# Patient Record
Sex: FEMALE | Race: WHITE | NOT HISPANIC OR LATINO | Employment: FULL TIME | URBAN - METROPOLITAN AREA
[De-identification: names, ages, dates, MRNs, and addresses within clinical notes are randomized per-mention and may not be internally consistent; named-entity substitution may affect disease eponyms.]

---

## 2021-04-14 LAB — GROUP B STREP PCR: NORMAL

## 2021-05-02 ENCOUNTER — HOSPITAL ENCOUNTER (INPATIENT)
Facility: CLINIC | Age: 31
LOS: 2 days | Discharge: HOME | End: 2021-05-04
Attending: OBSTETRICS & GYNECOLOGY | Admitting: OBSTETRICS & GYNECOLOGY
Payer: COMMERCIAL

## 2021-05-02 ENCOUNTER — ANESTHESIA (OUTPATIENT)
Dept: OBGYN | Facility: CLINIC | Age: 31
End: 2021-05-02
Payer: COMMERCIAL

## 2021-05-02 ENCOUNTER — ANESTHESIA EVENT (OUTPATIENT)
Dept: OBGYN | Facility: CLINIC | Age: 31
End: 2021-05-02
Payer: COMMERCIAL

## 2021-05-02 PROBLEM — Z37.9 NORMAL LABOR: Status: ACTIVE | Noted: 2021-05-02

## 2021-05-02 LAB
ABO + RH BLD: NORMAL
ABO + RH BLD: NORMAL
BASOPHILS # BLD AUTO: 0 10E9/L (ref 0–0.2)
BASOPHILS NFR BLD AUTO: 0.2 %
BLD GP AB SCN SERPL QL: NORMAL
BLOOD BANK CMNT PATIENT-IMP: NORMAL
DIFFERENTIAL METHOD BLD: ABNORMAL
EOSINOPHIL # BLD AUTO: 0 10E9/L (ref 0–0.7)
EOSINOPHIL NFR BLD AUTO: 0.1 %
ERYTHROCYTE [DISTWIDTH] IN BLOOD BY AUTOMATED COUNT: 13 % (ref 10–15)
HCT VFR BLD AUTO: 39.2 % (ref 35–47)
HGB BLD-MCNC: 13 G/DL (ref 11.7–15.7)
IMM GRANULOCYTES # BLD: 0.1 10E9/L (ref 0–0.4)
IMM GRANULOCYTES NFR BLD: 0.5 %
LABORATORY COMMENT REPORT: NORMAL
LYMPHOCYTES # BLD AUTO: 1.2 10E9/L (ref 0.8–5.3)
LYMPHOCYTES NFR BLD AUTO: 10.5 %
MCH RBC QN AUTO: 29.1 PG (ref 26.5–33)
MCHC RBC AUTO-ENTMCNC: 33.2 G/DL (ref 31.5–36.5)
MCV RBC AUTO: 88 FL (ref 78–100)
MONOCYTES # BLD AUTO: 0.8 10E9/L (ref 0–1.3)
MONOCYTES NFR BLD AUTO: 7.2 %
NEUTROPHILS # BLD AUTO: 9.4 10E9/L (ref 1.6–8.3)
NEUTROPHILS NFR BLD AUTO: 81.5 %
NRBC # BLD AUTO: 0 10*3/UL
NRBC BLD AUTO-RTO: 0 /100
PLATELET # BLD AUTO: 184 10E9/L (ref 150–450)
RBC # BLD AUTO: 4.46 10E12/L (ref 3.8–5.2)
SARS-COV-2 RNA RESP QL NAA+PROBE: NEGATIVE
SPECIMEN EXP DATE BLD: NORMAL
SPECIMEN SOURCE: NORMAL
WBC # BLD AUTO: 11.5 10E9/L (ref 4–11)

## 2021-05-02 PROCEDURE — 86900 BLOOD TYPING SEROLOGIC ABO: CPT | Performed by: OBSTETRICS & GYNECOLOGY

## 2021-05-02 PROCEDURE — 85025 COMPLETE CBC W/AUTO DIFF WBC: CPT | Performed by: OBSTETRICS & GYNECOLOGY

## 2021-05-02 PROCEDURE — 250N000011 HC RX IP 250 OP 636: Performed by: ANESTHESIOLOGY

## 2021-05-02 PROCEDURE — 10907ZC DRAINAGE OF AMNIOTIC FLUID, THERAPEUTIC FROM PRODUCTS OF CONCEPTION, VIA NATURAL OR ARTIFICIAL OPENING: ICD-10-PCS | Performed by: OBSTETRICS & GYNECOLOGY

## 2021-05-02 PROCEDURE — 36415 COLL VENOUS BLD VENIPUNCTURE: CPT | Performed by: OBSTETRICS & GYNECOLOGY

## 2021-05-02 PROCEDURE — 86850 RBC ANTIBODY SCREEN: CPT | Performed by: OBSTETRICS & GYNECOLOGY

## 2021-05-02 PROCEDURE — 3E0R3BZ INTRODUCTION OF ANESTHETIC AGENT INTO SPINAL CANAL, PERCUTANEOUS APPROACH: ICD-10-PCS | Performed by: ANESTHESIOLOGY

## 2021-05-02 PROCEDURE — 120N000001 HC R&B MED SURG/OB

## 2021-05-02 PROCEDURE — 250N000009 HC RX 250: Performed by: ANESTHESIOLOGY

## 2021-05-02 PROCEDURE — 86780 TREPONEMA PALLIDUM: CPT | Performed by: OBSTETRICS & GYNECOLOGY

## 2021-05-02 PROCEDURE — 258N000003 HC RX IP 258 OP 636: Performed by: OBSTETRICS & GYNECOLOGY

## 2021-05-02 PROCEDURE — G0463 HOSPITAL OUTPT CLINIC VISIT: HCPCS | Mod: 25

## 2021-05-02 PROCEDURE — 00HU33Z INSERTION OF INFUSION DEVICE INTO SPINAL CANAL, PERCUTANEOUS APPROACH: ICD-10-PCS | Performed by: ANESTHESIOLOGY

## 2021-05-02 PROCEDURE — 59025 FETAL NON-STRESS TEST: CPT

## 2021-05-02 PROCEDURE — 370N000003 HC ANESTHESIA WARD SERVICE

## 2021-05-02 PROCEDURE — 86901 BLOOD TYPING SEROLOGIC RH(D): CPT | Performed by: OBSTETRICS & GYNECOLOGY

## 2021-05-02 PROCEDURE — 87635 SARS-COV-2 COVID-19 AMP PRB: CPT | Performed by: OBSTETRICS & GYNECOLOGY

## 2021-05-02 RX ORDER — OXYCODONE AND ACETAMINOPHEN 5; 325 MG/1; MG/1
1 TABLET ORAL
Status: DISCONTINUED | OUTPATIENT
Start: 2021-05-02 | End: 2021-05-03

## 2021-05-02 RX ORDER — SODIUM CHLORIDE, SODIUM LACTATE, POTASSIUM CHLORIDE, CALCIUM CHLORIDE 600; 310; 30; 20 MG/100ML; MG/100ML; MG/100ML; MG/100ML
INJECTION, SOLUTION INTRAVENOUS CONTINUOUS
Status: DISCONTINUED | OUTPATIENT
Start: 2021-05-02 | End: 2021-05-03

## 2021-05-02 RX ORDER — NALOXONE HYDROCHLORIDE 0.4 MG/ML
0.2 INJECTION, SOLUTION INTRAMUSCULAR; INTRAVENOUS; SUBCUTANEOUS
Status: DISCONTINUED | OUTPATIENT
Start: 2021-05-02 | End: 2021-05-03

## 2021-05-02 RX ORDER — ONDANSETRON 2 MG/ML
4 INJECTION INTRAMUSCULAR; INTRAVENOUS EVERY 6 HOURS PRN
Status: DISCONTINUED | OUTPATIENT
Start: 2021-05-02 | End: 2021-05-04 | Stop reason: HOSPADM

## 2021-05-02 RX ORDER — OXYTOCIN 10 [USP'U]/ML
10 INJECTION, SOLUTION INTRAMUSCULAR; INTRAVENOUS
Status: DISCONTINUED | OUTPATIENT
Start: 2021-05-02 | End: 2021-05-03

## 2021-05-02 RX ORDER — OXYTOCIN/0.9 % SODIUM CHLORIDE 30/500 ML
100-340 PLASTIC BAG, INJECTION (ML) INTRAVENOUS CONTINUOUS PRN
Status: DISCONTINUED | OUTPATIENT
Start: 2021-05-02 | End: 2021-05-04 | Stop reason: HOSPADM

## 2021-05-02 RX ORDER — IBUPROFEN 400 MG/1
800 TABLET, FILM COATED ORAL
Status: DISCONTINUED | OUTPATIENT
Start: 2021-05-02 | End: 2021-05-04 | Stop reason: HOSPADM

## 2021-05-02 RX ORDER — NALBUPHINE HYDROCHLORIDE 10 MG/ML
2.5-5 INJECTION, SOLUTION INTRAMUSCULAR; INTRAVENOUS; SUBCUTANEOUS EVERY 6 HOURS PRN
Status: DISCONTINUED | OUTPATIENT
Start: 2021-05-02 | End: 2021-05-04 | Stop reason: HOSPADM

## 2021-05-02 RX ORDER — ONDANSETRON 2 MG/ML
4 INJECTION INTRAMUSCULAR; INTRAVENOUS EVERY 6 HOURS PRN
Status: DISCONTINUED | OUTPATIENT
Start: 2021-05-02 | End: 2021-05-03

## 2021-05-02 RX ORDER — ACETAMINOPHEN 325 MG/1
650 TABLET ORAL EVERY 4 HOURS PRN
Status: DISCONTINUED | OUTPATIENT
Start: 2021-05-02 | End: 2021-05-03

## 2021-05-02 RX ORDER — NALOXONE HYDROCHLORIDE 0.4 MG/ML
0.4 INJECTION, SOLUTION INTRAMUSCULAR; INTRAVENOUS; SUBCUTANEOUS
Status: DISCONTINUED | OUTPATIENT
Start: 2021-05-02 | End: 2021-05-03

## 2021-05-02 RX ORDER — TRANEXAMIC ACID 10 MG/ML
1 INJECTION, SOLUTION INTRAVENOUS EVERY 30 MIN PRN
Status: DISCONTINUED | OUTPATIENT
Start: 2021-05-02 | End: 2021-05-04 | Stop reason: HOSPADM

## 2021-05-02 RX ORDER — ROPIVACAINE HYDROCHLORIDE 2 MG/ML
INJECTION, SOLUTION EPIDURAL; INFILTRATION; PERINEURAL PRN
Status: DISCONTINUED | OUTPATIENT
Start: 2021-05-02 | End: 2021-05-13

## 2021-05-02 RX ORDER — CARBOPROST TROMETHAMINE 250 UG/ML
250 INJECTION, SOLUTION INTRAMUSCULAR
Status: DISCONTINUED | OUTPATIENT
Start: 2021-05-02 | End: 2021-05-03

## 2021-05-02 RX ORDER — FENTANYL CITRATE 50 UG/ML
100 INJECTION, SOLUTION INTRAMUSCULAR; INTRAVENOUS ONCE
Status: DISCONTINUED | OUTPATIENT
Start: 2021-05-02 | End: 2021-05-04 | Stop reason: HOSPADM

## 2021-05-02 RX ORDER — METHYLERGONOVINE MALEATE 0.2 MG/ML
200 INJECTION INTRAVENOUS
Status: DISCONTINUED | OUTPATIENT
Start: 2021-05-02 | End: 2021-05-03

## 2021-05-02 RX ORDER — LIDOCAINE HYDROCHLORIDE AND EPINEPHRINE 15; 5 MG/ML; UG/ML
INJECTION, SOLUTION EPIDURAL PRN
Status: DISCONTINUED | OUTPATIENT
Start: 2021-05-02 | End: 2021-05-13

## 2021-05-02 RX ORDER — ONDANSETRON 4 MG/1
4 TABLET, ORALLY DISINTEGRATING ORAL EVERY 6 HOURS PRN
Status: DISCONTINUED | OUTPATIENT
Start: 2021-05-02 | End: 2021-05-04 | Stop reason: HOSPADM

## 2021-05-02 RX ORDER — EPHEDRINE SULFATE 50 MG/ML
5 INJECTION, SOLUTION INTRAMUSCULAR; INTRAVENOUS; SUBCUTANEOUS
Status: DISCONTINUED | OUTPATIENT
Start: 2021-05-02 | End: 2021-05-04 | Stop reason: HOSPADM

## 2021-05-02 RX ORDER — PRENATAL VIT/IRON FUM/FOLIC AC 27MG-0.8MG
1 TABLET ORAL DAILY
COMMUNITY

## 2021-05-02 RX ORDER — FENTANYL CITRATE 50 UG/ML
INJECTION, SOLUTION INTRAMUSCULAR; INTRAVENOUS PRN
Status: DISCONTINUED | OUTPATIENT
Start: 2021-05-02 | End: 2021-05-13

## 2021-05-02 RX ORDER — ROPIVACAINE HYDROCHLORIDE 2 MG/ML
10 INJECTION, SOLUTION EPIDURAL; INFILTRATION; PERINEURAL ONCE
Status: DISCONTINUED | OUTPATIENT
Start: 2021-05-02 | End: 2021-05-04 | Stop reason: HOSPADM

## 2021-05-02 RX ADMIN — ROPIVACAINE HYDROCHLORIDE 5 ML: 2 INJECTION, SOLUTION EPIDURAL; INFILTRATION at 20:18

## 2021-05-02 RX ADMIN — SODIUM CHLORIDE, POTASSIUM CHLORIDE, SODIUM LACTATE AND CALCIUM CHLORIDE 1000 ML: 600; 310; 30; 20 INJECTION, SOLUTION INTRAVENOUS at 19:40

## 2021-05-02 RX ADMIN — SODIUM CHLORIDE, POTASSIUM CHLORIDE, SODIUM LACTATE AND CALCIUM CHLORIDE: 600; 310; 30; 20 INJECTION, SOLUTION INTRAVENOUS at 20:15

## 2021-05-02 RX ADMIN — LIDOCAINE HYDROCHLORIDE AND EPINEPHRINE 3 ML: 15; 5 INJECTION, SOLUTION EPIDURAL at 20:16

## 2021-05-02 RX ADMIN — Medication 12 ML/HR: at 20:15

## 2021-05-02 RX ADMIN — ROPIVACAINE HYDROCHLORIDE 5 ML: 2 INJECTION, SOLUTION EPIDURAL; INFILTRATION at 20:20

## 2021-05-02 RX ADMIN — FENTANYL CITRATE 100 MCG: 50 INJECTION, SOLUTION INTRAMUSCULAR; INTRAVENOUS at 20:18

## 2021-05-02 ASSESSMENT — ACTIVITIES OF DAILY LIVING (ADL)
DRESSING/BATHING_DIFFICULTY: NO
DIFFICULTY_COMMUNICATING: NO
HEARING_DIFFICULTY_OR_DEAF: NO
DIFFICULTY_EATING/SWALLOWING: NO
PATIENT_/_FAMILY_COMMUNICATION_STYLE: SPOKEN LANGUAGE (ENGLISH OR BILINGUAL)
TOILETING_ISSUES: NO
FALL_HISTORY_WITHIN_LAST_SIX_MONTHS: NO
WALKING_OR_CLIMBING_STAIRS_DIFFICULTY: NO
CONCENTRATING,_REMEMBERING_OR_MAKING_DECISIONS_DIFFICULTY: NO
DOING_ERRANDS_INDEPENDENTLY_DIFFICULTY: NO
FALL_HISTORY_WITHIN_LAST_SIX_MONTHS: NO
WEAR_GLASSES_OR_BLIND: YES

## 2021-05-02 ASSESSMENT — MIFFLIN-ST. JEOR: SCORE: 1503.79

## 2021-05-03 LAB — T PALLIDUM AB SER QL: NONREACTIVE

## 2021-05-03 PROCEDURE — 0KQM0ZZ REPAIR PERINEUM MUSCLE, OPEN APPROACH: ICD-10-PCS | Performed by: OBSTETRICS & GYNECOLOGY

## 2021-05-03 PROCEDURE — 250N000013 HC RX MED GY IP 250 OP 250 PS 637: Performed by: OBSTETRICS & GYNECOLOGY

## 2021-05-03 PROCEDURE — 120N000012 HC R&B POSTPARTUM

## 2021-05-03 PROCEDURE — 722N000001 HC LABOR CARE VAGINAL DELIVERY SINGLE

## 2021-05-03 RX ORDER — MODIFIED LANOLIN
OINTMENT (GRAM) TOPICAL
Status: DISCONTINUED | OUTPATIENT
Start: 2021-05-03 | End: 2021-05-04 | Stop reason: HOSPADM

## 2021-05-03 RX ORDER — OXYTOCIN 10 [USP'U]/ML
10 INJECTION, SOLUTION INTRAMUSCULAR; INTRAVENOUS
Status: DISCONTINUED | OUTPATIENT
Start: 2021-05-03 | End: 2021-05-04 | Stop reason: HOSPADM

## 2021-05-03 RX ORDER — ACETAMINOPHEN 325 MG/1
650 TABLET ORAL EVERY 4 HOURS PRN
Status: DISCONTINUED | OUTPATIENT
Start: 2021-05-03 | End: 2021-05-04 | Stop reason: HOSPADM

## 2021-05-03 RX ORDER — OXYTOCIN/0.9 % SODIUM CHLORIDE 30/500 ML
340 PLASTIC BAG, INJECTION (ML) INTRAVENOUS CONTINUOUS PRN
Status: DISCONTINUED | OUTPATIENT
Start: 2021-05-03 | End: 2021-05-04 | Stop reason: HOSPADM

## 2021-05-03 RX ORDER — AMOXICILLIN 250 MG
2 CAPSULE ORAL 2 TIMES DAILY
Status: DISCONTINUED | OUTPATIENT
Start: 2021-05-03 | End: 2021-05-04 | Stop reason: HOSPADM

## 2021-05-03 RX ORDER — BISACODYL 10 MG
10 SUPPOSITORY, RECTAL RECTAL DAILY PRN
Status: DISCONTINUED | OUTPATIENT
Start: 2021-05-05 | End: 2021-05-04 | Stop reason: HOSPADM

## 2021-05-03 RX ORDER — IBUPROFEN 800 MG/1
800 TABLET, FILM COATED ORAL EVERY 6 HOURS PRN
Start: 2021-05-03

## 2021-05-03 RX ORDER — TRANEXAMIC ACID 10 MG/ML
1 INJECTION, SOLUTION INTRAVENOUS EVERY 30 MIN PRN
Status: DISCONTINUED | OUTPATIENT
Start: 2021-05-03 | End: 2021-05-04 | Stop reason: HOSPADM

## 2021-05-03 RX ORDER — IBUPROFEN 400 MG/1
800 TABLET, FILM COATED ORAL EVERY 6 HOURS PRN
Status: DISCONTINUED | OUTPATIENT
Start: 2021-05-03 | End: 2021-05-04 | Stop reason: HOSPADM

## 2021-05-03 RX ORDER — AMOXICILLIN 250 MG
1 CAPSULE ORAL 2 TIMES DAILY
Status: DISCONTINUED | OUTPATIENT
Start: 2021-05-03 | End: 2021-05-04 | Stop reason: HOSPADM

## 2021-05-03 RX ORDER — AMOXICILLIN 250 MG
2 CAPSULE ORAL DAILY PRN
Start: 2021-05-03

## 2021-05-03 RX ORDER — HYDROCORTISONE 2.5 %
CREAM (GRAM) TOPICAL 3 TIMES DAILY PRN
Status: DISCONTINUED | OUTPATIENT
Start: 2021-05-03 | End: 2021-05-04 | Stop reason: HOSPADM

## 2021-05-03 RX ORDER — ACETAMINOPHEN 325 MG/1
650 TABLET ORAL EVERY 4 HOURS PRN
Start: 2021-05-03

## 2021-05-03 RX ORDER — OXYTOCIN/0.9 % SODIUM CHLORIDE 30/500 ML
100 PLASTIC BAG, INJECTION (ML) INTRAVENOUS CONTINUOUS
Status: DISCONTINUED | OUTPATIENT
Start: 2021-05-03 | End: 2021-05-04 | Stop reason: HOSPADM

## 2021-05-03 RX ADMIN — ACETAMINOPHEN 650 MG: 325 TABLET, FILM COATED ORAL at 16:44

## 2021-05-03 RX ADMIN — IBUPROFEN 800 MG: 400 TABLET ORAL at 07:57

## 2021-05-03 RX ADMIN — ACETAMINOPHEN 650 MG: 325 TABLET, FILM COATED ORAL at 01:39

## 2021-05-03 RX ADMIN — IBUPROFEN 800 MG: 400 TABLET ORAL at 01:39

## 2021-05-03 RX ADMIN — ACETAMINOPHEN 650 MG: 325 TABLET, FILM COATED ORAL at 23:05

## 2021-05-03 RX ADMIN — IBUPROFEN 800 MG: 400 TABLET ORAL at 23:05

## 2021-05-03 RX ADMIN — IBUPROFEN 800 MG: 400 TABLET ORAL at 16:44

## 2021-05-03 RX ADMIN — ACETAMINOPHEN 650 MG: 325 TABLET, FILM COATED ORAL at 07:57

## 2021-05-03 RX ADMIN — SENNOSIDES AND DOCUSATE SODIUM 1 TABLET: 8.6; 5 TABLET ORAL at 19:55

## 2021-05-03 RX ADMIN — SENNOSIDES AND DOCUSATE SODIUM 1 TABLET: 8.6; 5 TABLET ORAL at 07:57

## 2021-05-03 NOTE — ANESTHESIA PROCEDURE NOTES
Epidural catheter Procedure Note  Pre-Procedure   Staff -        Anesthesiologist:  Kamla Junior MD       Performed By: anesthesiologist       Location: OB       Pre-Anesthestic Checklist: patient identified, IV checked, site marked, risks and benefits discussed, informed consent, monitors and equipment checked, pre-op evaluation and at physician/surgeon's request  Timeout:       Correct Patient: Yes        Correct Procedure: Yes        Correct Site: Yes        Correct Position: Yes   Procedure Documentation  Procedure: epidural catheter       Patient Position: sitting       Skin prep: Betadine       Local skin infiltrated with 1 mL of 1% lidocaine.        Insertion Site: L2-3. (midline approach).       Technique: LORT saline and LORT air        Needle Type: Touhy needle       Needle Gauge: 17.        Needle Length (Inches): 3.5        Catheter: 19 G.         Catheter threaded easily.        # of attempts: 1 and  # of redirects:     Assessment/Narrative         Paresthesias: No.      Test dose of 3 mL lidocaine 1.5% w/ 1:200,000 epinephrine at.         Test dose negative, 3 minutes after injection, for signs of intravascular, subdural, or intrathecal injection.       Insertion/Infusion Method: LORT saline and LORT air       Aspiration negative for Heme or CSF via Epidural Catheter.    Comments:  Pre-procedure time out completed. Patient in sitting position, the lumbar spine was prepped and draped in sterile fashion. The L2/L3 interspace was identified and local anesthetic was injected for local skin infiltration. A 17 G touhy needle was advanced to the epidural space which was confirmed with the loss of resistance technique at 4.5 cm. A catheter was then advanced easily into the epidural space. The catheter was left at 8.5 cm at the skin. Negative aspiration of blood and CSF was confirmed. A test dose of 1.5% lidocaine with 1:200,000 epinephrine was injected through the catheter and was negative for  intravascular injection. The site was covered with sterile tegaderm and the catheter was secured with tape.

## 2021-05-03 NOTE — PLAN OF CARE
Pt admitted to [facility] [place], ambulatory per services of Dr Leigh for evaluation of labor eval.  Pt states that she started sera at 0430 and this afternoon they began stronger this afternoon. Pt appears uncomfortable amd is breathing through contractions.  Discussed plan of care including EFM with NST, routine VS, and SVE.  Pt agreeable.  EFM applied and admission assessment completed.  SVE 6-7/80/-1. Sera q 2-3. FHT's category 1. Dr Leigh updated at 1910. Received orders to admit to labor, labs and covid swab. Pt and  updated with plan. Report given to Binh Corley RN.

## 2021-05-03 NOTE — PROGRESS NOTES
"Linda Montalvo  May 3, 2021   PPD 0 s/p     S: 31 year old  delivered at 39w2d   Doing well without complaints.  Sore but medication helping.   Lochia moderate.   Ambulating.  Urinating without issues.  Breastfeeding.    O: /76   Pulse 62   Temp 98.7  F (37.1  C) (Temporal)   Resp 16   Ht 1.727 m (5' 8\")   Wt 73 kg (161 lb)   SpO2 97%   Breastfeeding Unknown   BMI 24.48 kg/m    Gen: NAD  Abd: soft, NT, ND, FF 2 below U  Ext: NT, nonedematous    A/P:  31 year old  PPD0 s/p   - ibuprofen and acetaminophen PRN pain  - support breastfeeding  - monitor lochia  - encourage ambulation  - regular diet  - routine PP care  - anticipated discharge to home tomorrow    Racquel Braxton MD  Text Page (8am - 5pm)    "

## 2021-05-03 NOTE — PROGRESS NOTES
"May 2, 2021      Asked by RN on behalf of Dr. Leigh to perform amniotomy.        /88   Pulse 85   Temp 99.4  F (37.4  C) (Temporal)   Resp 16   Ht 1.727 m (5' 8\")   Wt 73 kg (161 lb)   BMI 24.48 kg/m    Gen: NAD, A&O x 3, comfortable with epidural  Abd: gravid, NT  SVE: 8/90/-1, vertex, hand palpated on R, however was able to get baby move out of way with palpation> amniotomy performed, clear fluid noted  FHT: cat 1  TOCO: Q3-4 min        BEN PHILIPPE MD        "

## 2021-05-03 NOTE — PLAN OF CARE
Data: Patient admitted to room 232 at 1928. Patient is a . Prenatal record reviewed.   OB History    Para Term  AB Living   1 0 0 0 0 0   SAB TAB Ectopic Multiple Live Births   0 0 0 0 0      # Outcome Date GA Lbr Trip/2nd Weight Sex Delivery Anes PTL Lv   1 Current            .  Medical History: History reviewed. No pertinent past medical history..  Gestational age 39w1d. Vital signs per doc flowsheet. Fetal movement present. Patient reports Rule Out Labor   as reason for admission. Support persons Luke present.  Action: Report from HANNY Brothers obtained at 1920. Care of patient assumed at 1920. Verbal consent for EFM, external fetal monitors applied. Admission assessment completed. Patient and support persons educated on labor process. Patient instructed to report change in fetal movement, contractions, vaginal leaking of fluid or bleeding, abdominal pain, or any concerns related to the pregnancy to her nurse/physician. Patient oriented to room, call light in reach.   Response: Dr. Leigh informed of admission. Plan per provider is ok to get epidural when pt requests,  Cont labor process. Patient verbalized understanding of education and verbalized agreement with plan. Patient coping with labor and requesting epidural.       21   Provider Notification   Provider Name/Title [facility]   Method of Notification At Bedside   Request Evaluate in Person   Notification Reason Pain  (epidural placement )        21   Vaginal Exam   Method sterile exam per RN   Cervical Dilation (cm) 7   Cervical Effacement 80-90%   Fetal Station -1        21   Provider Notification   Provider Name/Title Dr Leigh    Method of Notification Phone   Request Evaluate - Remote   Notification Reason Status Update  (pt MD susanne requesting Dr Young to arom )        21   Provider Notification   Provider Name/Title Dr Yelitza Belle    Method of Notification At Bedside   Request Evaluate in Person    Notification Reason SVE;Membrane Status  (AROM )      05/02/21 2110   Vaginal Exam   Method sterile exam per physician   Cervical Consistency soft   Cervical Dilation (cm) 8   Cervical Effacement 90%   Fetal Station -1        05/02/21 2122   Provider Notification   Provider Name/Title Dr Leigh    Method of Notification Electronic Page   Request Evaluate - Remote   Notification Reason SVE;Membrane Status  (Dr Hector MUIR'd pt at 2110, 8/90/-1, clear fluid, baby wnl)        05/02/21 2302   Vaginal Exam   Method sterile exam per RN   Cervical Dilation (cm) 10   Cervical Effacement 100%   Fetal Station 1      05/02/21 2306   Provider Notification   Provider Name/Title Dr Leigh    Method of Notification Electronic Page   Request Evaluate - Remote   Notification Reason SVE;Status Update  (complete)      05/02/21 2309   Provider Notification   Provider Name/Title Dr Leigh    Method of Notification Phone   Request Evaluate - Remote   Notification Reason Status Update  (MD coming into hosp, start pushing )

## 2021-05-03 NOTE — PLAN OF CARE
Pt, support person, and infant transferred to unit at 0311 accompanied by labor RN.  ID bands double verified.  Pt oriented to room and call light placed within reach.  Safety protocols including band checks, safe sleep practices, and bulb syringe use reviewed.  Pt verbally acknowledged understanding of teaching.  Encouraged to call with questions or concerns.  VSS on RA.  Fundus firm, midline, U/ U .  Scant lochia rubra.  Pt is able to empty bladder independently.  Voiding adequately.  Up independently.  Pain managed w/Tylenol and Ibuprofen.  Pt breastfeeding with assistance.  Bonding well w/infant.  Independent w/infant cares.  Spouse supportive at bedside.  Nursing to continue to monitor.

## 2021-05-03 NOTE — L&D DELIVERY NOTE
"VAGINAL DELIVERY NOTE    Date of Delivery:  5/3/2021    Linda Montalvo is a 31 year old  who was admitted at 39w2d due to active labor at 39 weeks.   Her prenatal course was significant for normal care.   Her GBS was negative.  After admission to Labor and Delivery, the patient was 6-7cm and intact. Patient had regular contractions.   She received epidural for pain.  She has AROM with clear fluid at 8 cm dilated.   During the first stage the FHRT was category I.     The patient progressed to complete dilation and started pushing at 2330. The FHT's were category I and were monitored with external monitors.  She pushed with good effort for less than 1 hour and baby remained category I.     The fetal vertex delivered over an intact perineum in an JAROCHO position. A nuchal cord was not present. The remainder of the baby was delivered easily- a viable 8lb 9oz male infant named \"Garth Hernandez\"with Apgars of 8/9. There was no shoulder dystocia present. The baby was placed on the maternal abdomen and delayed cord clamping was done. The patient received IV pitocin immediately after delivery. Cord blood was obtained.  The placenta delivered spontaneously intact and was not sent to pathology.     There was a left labial 2nd degree laceration and a right introital laceration extending out 2 cm from introitus repaired with 3-0 Vicryl in the standard fashion with good hemostasis. After the repair a small superficial hematoma was seen inside of right side of the vagina and it was watched for several minutes and was not expanding.  The vagina and perineum were inspected and no additional tears noted. Count were correct and the fundus was massaged with the RN and it was firm with no active clots or bleeding.   EBL=100 ml.    Mother and infant were doing well.      No gross fetal defects were observed.  The baby was stable in Mom's arms.  The mother was stable in the labor bed.  Sponge and sharp count is correct. There were no " complications.    Sadia Leigh MD

## 2021-05-03 NOTE — LACTATION NOTE
Initial Lactation visit with Linda, significant other Berlin & baby Jack. Linda reports baby has been too sleepy to feed well so far. He's been spitty. LC reviewed typical  feeding patterns. Discussed cluster feeding, what it is and when to expect it, The Second Night, satiety cues, feeding cues, and reviewed Feeding Log for home use.     LC assisted to wake infant and try a feeding. At time of visit, baby showing some feeding cues, but pursed lips and disinterested in opening mouth to suck. Brought to breast, he attempted latching several times, but became gaggy as nipple entered mouth. Discussed general positioning recommendations, how to check for a good vs shallow latch, and encouraged calling for latch checks when he becomes interested in feeding. Encouraged pumping after this feeding attempt, and anytime moving forward that baby has a breastfeeding attempt, for stimulation. LC will bring pump to room and review initiate program, pump settings and cleaning.    Feeding plan; Recommend unlimited, frequent breast feedings: At least 8 - 12 times every 24 hours. Pump going forward anytime baby is too sleepy to feed. If baby becomes interested in latching, no need to pump with good feedings.    Recommended rooming in. Instructed in hand expression. Avoid pacifiers and supplementation with formula unless medically indicated. Explained benefits of holding baby skin on skin to help promote better breastfeeding outcomes. Linda has a pump for home use. Linda & Berlin very appreciative of Lactation support. Will revisit as needed.    Natasha Tobar, RN-C, IBCLC, MNN, PHN, BSN

## 2021-05-03 NOTE — H&P
[facility] Labor and Delivery History and Physical    Linda Montalvo MRN# [id number]   Age: 31 year old YOB: 1990     Date of Admission:  2021    Primary care provider: Ye Ribera           Chief Complaint:   Linda Montalvo is a 31 year old female  who is 39w1d pregnant and being admitted for active labor management.  Pt transferred to our group at 21 weeks; h/o LEEP at cervical length at 16 weeks normal.  NIPT normal- surprise gender; AFP normal. Pt had COVID #1 on 21 and was scheduled for #2 on 5/3/21.  Pt presented in labor at 6-7cm earlier tonight and received epidural; GBS negative; now complete; category I tracing so far.          Pregnancy history:     OBSTETRIC HISTORY:    OB History    Para Term  AB Living   1 0 0 0 0 0   SAB TAB Ectopic Multiple Live Births   0 0 0 0 0      # Outcome Date GA Lbr Trip/2nd Weight Sex Delivery Anes PTL Lv   1 Current                EDC: Estimated Date of Delivery: 21    Prenatal Labs:   Lab Results   Component Value Date    ABO A 2021    RH Pos 2021    AS Neg 2021    HGB 13.0 2021       GBS Status:   No results found for: GBS    Active Problem List  Patient Active Problem List   Diagnosis     Indication for care in labor or delivery       Medication Prior to Admission  Medications Prior to Admission   Medication Sig Dispense Refill Last Dose     Prenatal Vit-Fe Fumarate-FA (PRENATAL MULTIVITAMIN W/IRON) 27-0.8 MG tablet Take 1 tablet by mouth daily   2021 at Unknown time   .        Maternal Past Medical History:   History reviewed. No pertinent past medical history.                    Family History:   This patient has no significant family history            Social History:   This patient has no significant social history         Review of Systems:   The Review of Systems is negative other than noted in the HPI          Physical Exam:     Vitals were reviewed  Patient Vitals  for the past 8 hrs:   BP Temp Temp src Pulse Resp SpO2 Height Weight   05/02/21 2205 106/68 99.3  F (37.4  C) Temporal -- -- -- -- --   05/02/21 2148 106/65 -- -- -- -- -- -- --   05/02/21 2146 107/62 -- -- -- -- -- -- --   05/02/21 2145 -- -- -- -- -- 95 % -- --   05/02/21 2144 107/66 -- -- -- -- -- -- --   05/02/21 2142 112/67 -- -- -- -- -- -- --   05/02/21 2140 108/67 -- -- -- -- 96 % -- --   05/02/21 2138 105/64 -- -- -- -- -- -- --   05/02/21 2136 104/65 -- -- -- -- -- -- --   05/02/21 2134 107/66 -- -- -- -- -- -- --   05/02/21 2132 105/65 -- -- -- -- -- -- --   05/02/21 2130 116/68 -- -- -- -- 97 % -- --   05/02/21 2128 108/64 -- -- -- -- -- -- --   05/02/21 2126 108/65 -- -- -- -- -- -- --   05/02/21 2125 -- -- -- -- -- 99 % -- --   05/02/21 2124 103/61 -- -- -- -- -- -- --   05/02/21 2122 97/60 -- -- -- -- -- -- --   05/02/21 2120 116/67 -- -- -- -- -- -- --   05/02/21 2118 109/66 -- -- -- -- -- -- --   05/02/21 2116 114/77 -- -- -- -- -- -- --   05/02/21 2115 -- -- -- -- -- 100 % -- --   05/02/21 2114 110/73 -- -- -- -- -- -- --   05/02/21 2112 122/77 -- -- -- -- -- -- --   05/02/21 2110 101/62 -- -- -- -- 97 % -- --   05/02/21 2108 106/64 -- -- -- -- -- -- --   05/02/21 2106 106/61 -- -- -- -- -- -- --   05/02/21 2105 -- (P) 99.1  F (37.3  C) (P) Temporal -- -- -- -- --   05/02/21 2104 98/62 -- -- -- -- -- -- --   05/02/21 2102 108/62 -- -- -- -- -- -- --   05/02/21 2100 103/61 -- -- -- -- -- -- --   05/02/21 2058 99/58 -- -- -- -- -- -- --   05/02/21 2056 99/59 -- -- -- -- -- -- --   05/02/21 2054 107/64 -- -- -- -- -- -- --   05/02/21 2052 106/62 -- -- -- -- -- -- --   05/02/21 2050 101/60 -- -- -- -- 97 % -- --   05/02/21 2048 98/57 -- -- -- -- -- -- --   05/02/21 2046 95/52 -- -- -- -- -- -- --   05/02/21 2045 -- -- -- -- -- 98 % -- --   05/02/21 2044 108/54 -- -- -- -- -- -- --   05/02/21 2040 113/73 -- -- -- -- -- -- --   05/02/21 2038 112/76 -- -- -- -- -- -- --   05/02/21 2036 111/72 -- -- -- --  "-- -- --   21 118/72 -- -- -- -- -- -- --   21 115/70 -- -- -- -- -- -- --   21 122/72 -- -- -- -- -- -- --   21 115/70 -- -- -- -- -- -- --   21 115/69 -- -- -- -- -- -- --   21 -- -- -- -- -- 99 % -- --   21 111/68 -- -- -- -- -- -- --   21 112/64 -- -- -- -- -- -- --   21 -- -- -- -- -- 99 % -- --   21 114/77 -- -- -- -- -- -- --   21 -- -- -- -- -- 99 % -- --   21 -- (P) 98.4  F (36.9  C) (P) Temporal -- -- -- -- --   21 1900 -- 99.3  F (37.4  C) Temporal -- -- -- -- --   21 1858 134/88 99.4  F (37.4  C) Temporal 85 16 -- 1.727 m (5' 8\") 73 kg (161 lb)     Constitutional:   awake, alert, cooperative, no apparent distress, and appears stated age      Cervix: complete/100%/1+ station  Presentation:Cephalic  Fetal Heart Rate Tracing: reactive and reassuring  Tocometer: external monitor                       Assessment:   Linda Montalvo is a 39w1d pregnant female admitted with active labor management.1. IUP at 39 weeks  2. Active labor-    3. Fetal well being- category I          Plan:   1. Continue pushing; anticipate .    Sadia Leigh MD  "

## 2021-05-03 NOTE — ANESTHESIA PREPROCEDURE EVALUATION
Anesthesia Pre-Procedure Evaluation    Patient: Linda Montalvo   MRN: [id number] : 1990        Preoperative Diagnosis: * No surgery found *   Procedure :      History reviewed. No pertinent past medical history.   History reviewed. No pertinent surgical history.   No Known Allergies   Social History     Tobacco Use     Smoking status: Never Smoker     Smokeless tobacco: Never Used   Substance Use Topics     Alcohol use: Not Currently      Wt Readings from Last 1 Encounters:   21 73 kg (161 lb)        Prior to Admission medications    Medication Sig Start Date End Date Taking? Authorizing Provider   Prenatal Vit-Fe Fumarate-FA (PRENATAL MULTIVITAMIN W/IRON) 27-0.8 MG tablet Take 1 tablet by mouth daily   Yes Reported, Patient       Anesthesia Evaluation            ROS/MED HX  ENT/Pulmonary:  - neg pulmonary ROS     Neurologic:  - neg neurologic ROS     Cardiovascular:  - neg cardiovascular ROS     METS/Exercise Tolerance:     Hematologic:  - neg hematologic  ROS     Musculoskeletal:       GI/Hepatic:  - neg GI/hepatic ROS     Renal/Genitourinary:       Endo:  - neg endo ROS     Psychiatric/Substance Use:  - neg psychiatric ROS     Infectious Disease:       Malignancy:       Other:            Physical Exam    Airway        Mallampati: II   TM distance: > 3 FB   Neck ROM: full   Mouth opening: > 3 cm    Respiratory Devices and Support         Dental  no notable dental history         Cardiovascular   cardiovascular exam normal          Pulmonary   pulmonary exam normal                OUTSIDE LABS:  CBC:   Lab Results   Component Value Date    WBC 11.5 (H) 2021    HGB 13.0 2021    HCT 39.2 2021     2021     BMP: No results found for: NA, POTASSIUM, CHLORIDE, CO2, BUN, CR, GLC  COAGS: No results found for: PTT, INR, FIBR  POC: No results found for: BGM, HCG, HCGS  HEPATIC: No results found for: ALBUMIN, PROTTOTAL, ALT, AST, GGT, ALKPHOS, BILITOTAL, BILIDIRECT, TUCKER  OTHER:  No results found for: PH, LACT, A1C, FAMILIA, PHOS, MAG, LIPASE, AMYLASE, TSH, T4, T3, CRP, SED    Anesthesia Plan    ASA Status:  2      Anesthesia Type: Epidural.              Consents    Anesthesia Plan(s) and associated risks, benefits, and realistic alternatives discussed. Questions answered and patient/representative(s) expressed understanding.     - Discussed with:  Patient         Postoperative Care            Comments:           neg OB ROS.       Kamla Junior MD

## 2021-05-03 NOTE — PLAN OF CARE
Vitals stable. Up ad teetee well. Voiding without difficulty. Pain controlled with tylenol and ibuprofen. Working on breastfeeding. Infant sleepy, gaggy, and spitty. Lactation in to see. Will start pumping.

## 2021-05-03 NOTE — PLAN OF CARE
Data: Linda Montalvo transferred to [facility] via wheelchair at 0319. Baby transferred in mothers arms.  Action: Receiving unit notified of transfer: Yes. Patient and family notified of room change. Report given to Abida Velasco at 0319. Belongings sent to receiving unit. Accompanied by Registered Nurse. Oriented patient to surroundings. Call light within reach. ID bands double-checked with receiving RN.  Response: Patient tolerated transfer and is stable.

## 2021-05-04 VITALS
WEIGHT: 161 LBS | DIASTOLIC BLOOD PRESSURE: 64 MMHG | HEART RATE: 70 BPM | HEIGHT: 68 IN | OXYGEN SATURATION: 97 % | BODY MASS INDEX: 24.4 KG/M2 | RESPIRATION RATE: 18 BRPM | TEMPERATURE: 97.9 F | SYSTOLIC BLOOD PRESSURE: 103 MMHG

## 2021-05-04 LAB — HGB BLD-MCNC: 11.7 G/DL (ref 11.7–15.7)

## 2021-05-04 PROCEDURE — 85018 HEMOGLOBIN: CPT | Performed by: OBSTETRICS & GYNECOLOGY

## 2021-05-04 PROCEDURE — 250N000013 HC RX MED GY IP 250 OP 250 PS 637: Performed by: OBSTETRICS & GYNECOLOGY

## 2021-05-04 PROCEDURE — 36415 COLL VENOUS BLD VENIPUNCTURE: CPT | Performed by: OBSTETRICS & GYNECOLOGY

## 2021-05-04 RX ADMIN — ACETAMINOPHEN 650 MG: 325 TABLET, FILM COATED ORAL at 09:20

## 2021-05-04 RX ADMIN — IBUPROFEN 800 MG: 400 TABLET ORAL at 11:58

## 2021-05-04 RX ADMIN — IBUPROFEN 800 MG: 400 TABLET ORAL at 05:11

## 2021-05-04 RX ADMIN — ACETAMINOPHEN 650 MG: 325 TABLET, FILM COATED ORAL at 05:11

## 2021-05-04 RX ADMIN — SENNOSIDES AND DOCUSATE SODIUM 1 TABLET: 8.6; 5 TABLET ORAL at 09:20

## 2021-05-04 NOTE — PLAN OF CARE
D: VSS, assessments WDL. Taking Tylenol and Ibuprofen for pain. Using IP and Tucks. Breastfeeding her baby boy who fed well this am.   I: Pt. received complete discharge paperwork.  Pt. was given times of last dose for all discharge medications in writing on discharge medication sheets.  Discharge teaching included home medication, pain management, activity restrictions, postpartum cares, and signs and symptoms of infection.    A: Discharge outcomes on care plan met.  Mother states understanding and comfort with self care and follow up care.   P: Pt. Discharged.  Pt. was accompanied by  and left with personal belongings.  Home care sent.  Pt. to follow up with OB provider per discharge instructions.  Pt. had no further questions at the time of discharge and no unmet needs were identified.

## 2021-05-04 NOTE — PROGRESS NOTES
"Linda Montalvo  May 4, 2021    S: pt doing well.  Pain well controlled.  Ambulating without difficulty.  Tolerating po intake.  Decreased lochia.  Breast feeding.    O: /79   Pulse 68   Temp 98.3  F (36.8  C) (Oral)   Resp 16   Ht 1.727 m (5' 8\")   Wt 73 kg (161 lb)   SpO2 97%   Breastfeeding Unknown   BMI 24.48 kg/m    Recent Labs   Lab 21   HGB 13.0     Abdomen: soft, non tender, fundus firm below the umbilicus  Ext: non tender, no edema or erythema    A/P: s/p  PPD #1  Doing well  Continue routine post partum care  Discharge planning for today    Ye Ribera MD  "

## 2021-05-04 NOTE — ANESTHESIA POSTPROCEDURE EVALUATION
Patient: Linda Montalvo    * No procedures listed *    Diagnosis:* No pre-op diagnosis entered *  Diagnosis Additional Information: No value filed.    Anesthesia Type:  Epidural    Note:  Disposition: Inpatient   Postop Pain Control: Uneventful            Sign Out: Well controlled pain   PONV: No   Neuro/Psych: Uneventful            Sign Out: Acceptable/Baseline neuro status   Airway/Respiratory: Uneventful            Sign Out: Acceptable/Baseline resp. status   CV/Hemodynamics: Uneventful            Sign Out: Acceptable CV status   Other NRE: NONE   DID A NON-ROUTINE EVENT OCCUR? No    Event details/Postop Comments:  Pt doing well post-delivery.  Denies epidural-related complaints.  Interview via telephone.            Last vitals:  Vitals:    05/04/21 0027 05/04/21 0511 05/04/21 0554   BP: 118/79     Pulse: 68     Resp: 16 16 16   Temp: 36.8  C (98.3  F)     SpO2:          Last vitals prior to Anesthesia Care Transfer:      Electronically Signed By: Rc Faria MD  May 4, 2021  8:32 AM

## 2021-05-04 NOTE — LACTATION NOTE
Follow up Lactation visit with Linda, significant other [place] & baby pasha Liang. Getting ready for discharge. Linda reports feeding is going much better, with baby waking and latching much better since late evening yesterday. She's working on getting deep, comfortable latches with each feeding. Using lanolin after feedings. Reviewed ways to check and adjust latch as needed. Reviewed positioning for breastfeeding.  Discussed cluster feeding, what it is and when to expect it, The Second Night, satiety cues, feeding cues, and reviewed Feeding Log for home use.     Reviewed milk supply and engorgement. Reviewed typical timeline of milk supply initiation and progression over first 3-5 days postpartum. Discussed comfort measures for engorgement, plugged duct treatment, and warning signs of breast infection. Discussed using breast pump in preparation for return to work. Discussed milk storage, introducing a bottle, and general recommendation to wait to start pumping for milk storage or bottle feeding in preparation for return to work until breastfeeding is well established in 3-4 weeks. Exceptions: if feeding is poor or baby needs to supplement for medical reasons.    Feeding plan: Recommend unlimited, frequent breast feedings: At least 8 - 12 times every 24 hours. Avoid pacifiers and supplementation with formula unless medically indicated. Encouraged use of feeding log and to record feedings, and void/stool patterns. Linda has a breast pump for home use. Follow up with Alysia Carlisle, encouraged Lactation follow up in clinic as needed. Reviewed outpatient lactation resources. Linda & Berlin appreciative of visit & Lactation support.    Natasha Tobar, RN-C, IBCLC, MNN, PHN, BSN

## 2021-05-04 NOTE — PLAN OF CARE
Vital signs stable. Postpartum assessment WDL. Patient voiding without difficulty. Able to ambulate in room free of dizziness. Taking tylenol/ibuprofen for pain management. Working on breastfeeding infant every 2-3 hours, pumping, and supplementing infant with EBM. Encouraged to call with questions/concerns. Will continue to monitor.

## 2021-05-04 NOTE — PLAN OF CARE
VSS on RA.  Fundus firm, midline, U/U.  Scant lochia rubra.  Pt is able to empty bladder independently.  Voiding adequately.  Up independently.  Pain managed w/Tylenol and Ibuprofen.  Pt breastfeeding independently.  Bonding well w/infant.  Independent w/infant cares.  Spouse supportive at bedside.  Nursing to continue to monitor.

## 2022-11-28 PROCEDURE — 88305 TISSUE EXAM BY PATHOLOGIST: CPT | Mod: 26 | Performed by: PATHOLOGY

## 2022-11-28 PROCEDURE — 88305 TISSUE EXAM BY PATHOLOGIST: CPT | Mod: TC,ORL | Performed by: STUDENT IN AN ORGANIZED HEALTH CARE EDUCATION/TRAINING PROGRAM

## 2022-11-29 ENCOUNTER — LAB REQUISITION (OUTPATIENT)
Dept: LAB | Facility: CLINIC | Age: 32
End: 2022-11-29
Payer: COMMERCIAL

## 2022-11-30 LAB
PATH REPORT.COMMENTS IMP SPEC: NORMAL
PATH REPORT.COMMENTS IMP SPEC: NORMAL
PATH REPORT.FINAL DX SPEC: NORMAL
PATH REPORT.GROSS SPEC: NORMAL
PATH REPORT.MICROSCOPIC SPEC OTHER STN: NORMAL
PATH REPORT.RELEVANT HX SPEC: NORMAL
PHOTO IMAGE: NORMAL

## 2023-02-16 ENCOUNTER — ANCILLARY PROCEDURE (OUTPATIENT)
Dept: GENERAL RADIOLOGY | Facility: CLINIC | Age: 33
End: 2023-02-16
Attending: STUDENT IN AN ORGANIZED HEALTH CARE EDUCATION/TRAINING PROGRAM
Payer: COMMERCIAL

## 2023-02-16 DIAGNOSIS — Z31.41 FERTILITY TESTING: ICD-10-CM

## 2023-02-16 PROCEDURE — 74740 X-RAY FEMALE GENITAL TRACT: CPT

## 2023-02-16 PROCEDURE — 255N000002 HC RX 255 OP 636: Performed by: STUDENT IN AN ORGANIZED HEALTH CARE EDUCATION/TRAINING PROGRAM

## 2023-02-16 RX ORDER — IOPAMIDOL 612 MG/ML
30 INJECTION, SOLUTION INTRAVASCULAR ONCE
Status: COMPLETED | OUTPATIENT
Start: 2023-02-16 | End: 2023-02-16

## 2023-02-16 RX ADMIN — IOPAMIDOL 5 ML: 612 INJECTION, SOLUTION INTRAVENOUS at 07:33

## 2023-08-07 ENCOUNTER — TRANSCRIBE ORDERS (OUTPATIENT)
Dept: MATERNAL FETAL MEDICINE | Facility: HOSPITAL | Age: 33
End: 2023-08-07
Payer: COMMERCIAL

## 2023-08-07 DIAGNOSIS — Z31.69 ENCOUNTER FOR PRECONCEPTION CONSULTATION: Primary | ICD-10-CM

## 2023-08-09 ENCOUNTER — VIRTUAL VISIT (OUTPATIENT)
Dept: MATERNAL FETAL MEDICINE | Facility: HOSPITAL | Age: 33
End: 2023-08-09
Attending: OBSTETRICS & GYNECOLOGY
Payer: COMMERCIAL

## 2023-08-09 DIAGNOSIS — Z31.5 ENCOUNTER FOR PROCREATIVE GENETIC COUNSELING: ICD-10-CM

## 2023-08-09 DIAGNOSIS — Z31.69 ENCOUNTER FOR PRECONCEPTION CONSULTATION: ICD-10-CM

## 2023-08-09 DIAGNOSIS — E78.01 FAMILIAL HYPERCHOLESTEROLEMIA: Primary | ICD-10-CM

## 2023-08-09 PROCEDURE — 96040 HC GENETIC COUNSELING, EACH 30 MINUTES: CPT | Mod: GT,95 | Performed by: GENETIC COUNSELOR, MS

## 2023-08-09 NOTE — PROGRESS NOTES
[facility] [place]  Genetic Counseling Consult    Patient:  Linda Montalvo YOB: 1990   Date of Service:  8/09/23   MRN: [id number]      Linda was evaluated via a billable telephone visit at [facility] for genetic consultation given abnormal carrier screening results with personal and reproductive risks.     The patient has been notified of the following:  This telephone visit will be conducted via a call between you and your physician/provider. We have found that certain health care needs can be provided without the need for a physical exam. This service lets us provide the care you need with a short phone conversation. If a prescription is necessary we can send it directly to your pharmacy. If lab work is needed we can place an order for that and you can then stop by our lab to have the test done at a later time.     If during the course of the call the provider feels a telephone visit is not appropriate, you will not be charged for this service.      The session was conducted in English.      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      Linda and Berlin are at risk for hypercholesterolemia themselves as a result of the identified LDLR variant, and close monitoring through their primary care providers is recommended.    Each pregnancy/conception the couple has is at 25% risk for having 2 disease causing LDLR variants, commonly referred to as homozygous familial hypercholesterolemia.  This childhood onset condition can require significant management and intervention, and if left untreated has a high association with cardiovascular disease and death under the age of 30.       Cholesterol evaluation and possible genetic testing  "is recommended for their 2 year old son.     Linda and Berlin are planning IVF due to unexplained infertility and will likely explore the option of PGT-M as a part of that care to mitigate risks for homozygous familial hypercholesterolemia in future children.        PREGNANCY HISTORY   /Parity:      Linda s pregnancy history is significant for 1 prior full term pregnancy with no reported obstetric complications.      Discussion     Linda was referred to genetic counseling to discuss her and her  Berlin's recent expanded carrier screening which identified variants in the LDLR gene for both of them.  Variants in this gene are responsible for autosomal dominant familial hypercholesterolemia, and because both of them have a variant, their offspring would be at risk for inheriting two LDLR variants, which is commonly referred to as homozygous familial hypercholesterolemia (HoFH).      Hypercholesterolemia can be multifactorial/polygenic, meaning there are several small additive genetic factors plus environmental factors that contribute to disease, or it can be monogenic, meaning a change (variant) in a single gene is causing disease. Familial hypercholesterolemia (FH) is caused by a change in one of three genes: APOB, LDLR, PCSK9. FH causes severely elevated LDL cholesterol (LDL-C) levels at an early age leading to a greatly increased risk for cardiovascular disease (most commonly coronary artery disease). Untreated adults with FH typically have LDL-C>190 mg/dL or total cholesterol levels >310 mg/dL. Patches of yellowish cholesterol buildup under the skin, called xanthomas, are also common in individuals with FH. Xanthomas tend to occur mostly around eyelids and within the tendons of the elbows, hands, knees, and feet and may become more apparent with age.     All individuals with FH should be considered \"high risk\" (i.e., increased ~20-fold) for coronary artery disease. Individuals with FH may " need to see a lipid specialist who can treat them with statin-based therapy with addition of other medications as needed. A heart-healthy diet (including reduced intake of saturated fat and increased intake of soluble fiber to 10-20 g/day), increased physical activity, and not smoking are also recommended. These risks apply to both Linda and Berlin, and discussion with their primary care providers is recommended.    FH is inherited in an autosomal dominant inheritance pattern meaning a single pathogenic variant in one of the FH genes (APOB, LDLR, PCSK9) is sufficient to cause disease. Individuals with autosomal dominant conditions have a 1 in 2 (50%) chance of passing their FH variant to each child.   Because both Linda and Berlin have LDLR variants, their children are at risk for having two pathogenic variants in the LDLR gene, one inherited from each parent.  This is referred to as homozygous familial hypercholesterolemia (HoFH) and causes even higher levels of LDL-C and severe cardiovascular disease usually by the mid-20s. Statins are often relatively ineffective in the treatment of HoFH, and these individuals may require LDL apheresis in addition to the use of multiple other medications.  We discussed that HoFH is typically considered a chronic medical condition, and if left unmanaged/untreated, a majority of affected individuals will experience a heart attack under the age of 30.  Linda's 2 year old son's LDLR status is unknown, and we discussed that at minimum LDL cholesterol screening is recommended for him, and that if his levels are ever elevated, evaluation and management with a provider familial with HoFH is recommended.     The couple's primary focus is whether or not to pursue PGT-M for their LDLR variants due to the risks for childhood health complications from HoFH.  Linda understands that each conception they have will be at 25% probability for HoFH.  She demonstrated a strong understanding of  PGT-M, its benefits and limitations, as well as how it would impact their timeline for family planning.  Her main concern with PGT-M is the potential for it to delay pregnancy for them by months.  After discussion the possible severity of HoFH, Linda came to the decision that pursuing PGT-M would likely be the best path forward for them.  She plans to discuss further with her  and follow up with genetic counseling as well as her IVF provider with any questions or concerns moving forward.        It was a pleasure to be involved with Linda s care. Face-to-face time of the meeting was  36  minutes.    Sacha West, GC, MS, [facility]  Certified and [place] Licensed Genetic Counselor  [facility]  Maternal Fetal Medicine  Office: [phone]  [facility]: [phone]   Fax: [phone]  [facility]

## 2023-08-12 ENCOUNTER — HEALTH MAINTENANCE LETTER (OUTPATIENT)
Age: 33
End: 2023-08-12

## 2024-10-05 ENCOUNTER — HEALTH MAINTENANCE LETTER (OUTPATIENT)
Age: 34
End: 2024-10-05